# Patient Record
Sex: MALE | Race: WHITE | Employment: STUDENT | URBAN - NONMETROPOLITAN AREA
[De-identification: names, ages, dates, MRNs, and addresses within clinical notes are randomized per-mention and may not be internally consistent; named-entity substitution may affect disease eponyms.]

---
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
## 2022-03-07 ENCOUNTER — HOSPITAL ENCOUNTER (EMERGENCY)
Age: 22
Discharge: HOME | End: 2022-03-07
Attending: FAMILY MEDICINE
Payer: COMMERCIAL

## 2022-03-07 VITALS
DIASTOLIC BLOOD PRESSURE: 82 MMHG | OXYGEN SATURATION: 99 % | WEIGHT: 170.4 LBS | SYSTOLIC BLOOD PRESSURE: 137 MMHG | HEIGHT: 74 IN | TEMPERATURE: 98.6 F | HEART RATE: 83 BPM | RESPIRATION RATE: 18 BRPM | BODY MASS INDEX: 21.87 KG/M2

## 2022-03-07 DIAGNOSIS — J10.1 INFLUENZA A: Primary | ICD-10-CM

## 2022-03-07 LAB
DIRECT EXAM: ABNORMAL
DIRECT EXAM: ABNORMAL
DIRECT EXAM: NORMAL
SARS-COV-2, RAPID: NOT DETECTED
SPECIMEN DESCRIPTION: ABNORMAL
SPECIMEN DESCRIPTION: NORMAL
SPECIMEN DESCRIPTION: NORMAL

## 2022-03-07 PROCEDURE — 87880 STREP A ASSAY W/OPTIC: CPT

## 2022-03-07 PROCEDURE — 87635 SARS-COV-2 COVID-19 AMP PRB: CPT

## 2022-03-07 PROCEDURE — 87804 INFLUENZA ASSAY W/OPTIC: CPT

## 2022-03-07 PROCEDURE — 36415 COLL VENOUS BLD VENIPUNCTURE: CPT

## 2022-03-07 PROCEDURE — 99283 EMERGENCY DEPT VISIT LOW MDM: CPT

## 2022-03-07 ASSESSMENT — PAIN - FUNCTIONAL ASSESSMENT: PAIN_FUNCTIONAL_ASSESSMENT: 0-10

## 2022-03-07 ASSESSMENT — PAIN SCALES - GENERAL: PAINLEVEL_OUTOF10: 6

## 2022-03-07 ASSESSMENT — PAIN DESCRIPTION - LOCATION: LOCATION: HEAD

## 2022-03-07 ASSESSMENT — PAIN DESCRIPTION - DESCRIPTORS: DESCRIPTORS: SQUEEZING

## 2022-03-07 ASSESSMENT — PAIN DESCRIPTION - PAIN TYPE: TYPE: ACUTE PAIN

## 2022-03-07 NOTE — ED PROVIDER NOTES
[address] [facility]  eMERGENCY dEPARTMENT eNCOUnter          [address] [facility]       Chief Complaint   Patient presents with    Fever     Pt states has had fever on and off for that past 3 days. Also feels congested, body aches, and vomiting    Headache     States has had a headache \"feels like a band all the way around, maybe a migraine\"       Nurses Notes reviewed and I agree except as noted in the HPI. HISTORY OF PRESENT ILLNESS    Mitch Cooper is a 25 y.o. male who presents the emergency room via private vehicle, patient complaining of remittent fevers for the past 3 days, it has been had some congestion and general aches, has had some vomiting estimates only 3 events last night, as of having some headaches, describing a bandlike feeling around his head, states earlier he did have some photophobia though none current. .  Patient denies any diarrhea denies any rash, denies any falls or trauma denies any striking in the head. Patient denies any history of headaches or migraines prior. Patient states developed a sore throat this morning. No known sick contacts. Patient has been using ibuprofen at home to control his fevers. Patient rates his overall discomfort 6 out of 10 in his head. PCP: Romi Ball    REVIEW OF SYSTEMS     Review of Systems   All other systems reviewed and are negative. PAST MEDICAL HISTORY    has no past medical history on file. SURGICAL HISTORY      has no past surgical history on file. CURRENT MEDICATIONS       Discharge Medication List as of 3/7/2022 11:35 AM      CONTINUE these medications which have NOT CHANGED    Details   albuterol sulfate HFA (PROVENTIL HFA) 108 (90 Base) MCG/ACT inhaler Inhale 2 puffs into the lungs every 4 hours as needed for Wheezing or Shortness of Breath, Disp-1 Inhaler, R-1Print             ALLERGIES     has No Known Allergies. FAMILY HISTORY     He indicated that his father is alive.    family history includes High Blood Pressure in his father. SOCIAL HISTORY      reports that he quit smoking about 11 months ago. He started smoking about 5 years ago. He has a 1.00 pack-year smoking history. He has never used smokeless tobacco. He reports current drug use. Drug: Marijuana Lanis Lay). He reports that he does not drink alcohol. PHYSICAL EXAM     INITIAL VITALS:  height is 6' 2\" (1.88 m) and weight is 170 lb 6.4 oz (77.3 kg). His oral temperature is 98.6 °F (37 °C). His blood pressure is 137/82 and his pulse is 83. His respiration is 18 and oxygen saturation is 99%. Physical Exam   Constitutional: Patient is oriented to person, place, and time. Patient appears well-developed and well-nourished. Patient is active and cooperative. HENT:   Head: Normocephalic and atraumatic. Head is without contusion. Right Ear: Hearing and external ear normal. No drainage. Left Ear: Hearing and external ear normal. No drainage. Nose: Nose normal. No nasal deformity. No epistaxis. Mouth/Throat: Mucous membranes are not dry. Negative oral lesions or exudate, noted some posterior pharyngeal erythema  Eyes: EOMI. Conjunctivae, sclera, and lids are normal. Right eye exhibits no discharge. Left eye exhibits no discharge. Neck: Full passive range of motion without pain and phonation normal.   Cardiovascular:  Normal rate, regular rhythm and intact distal pulses. Pulses: Right radial pulse  2+   Pulmonary/Chest: Effort normal. No tachypnea and no bradypnea. No wheezes, rhonchi, or rales. Abdominal: Soft. Patient without distension or tenderness  Musculoskeletal:   Negative acute trauma or deformity,  apparent full range of motion and normal strength all extremities appropriate to age. Neurological: Patient is alert and oriented to person, place, and time. patient displays no tremor. Patient displays no seizure activity. .  Lymphatic: No gross cervical lymphadenopathy  Skin: Skin is warm and dry. Patient is not diaphoretic.   Psychiatric: Patient has a normal mood and affect. Patient speech is normal and behavior is normal. Cognition and memory are normal.    DIFFERENTIAL DIAGNOSIS:   Viral illness NOS    DIAGNOSTIC RESULTS           RADIOLOGY: non-plain film images(s) such as CT, Ultrasound and MRI are read by the radiologist.  No orders to display       LABS:   Labs Reviewed   RAPID INFLUENZA A/B ANTIGENS   COVID-19, RAPID   STREP SCREEN GROUP A THROAT       EMERGENCY DEPARTMENT COURSE:   Vitals:    Vitals:    03/07/22 1005   BP: 137/82   Pulse: 83   Resp: 18   Temp: 98.6 °F (37 °C)   TempSrc: Oral   SpO2: 99%   Weight: 170 lb 6.4 oz (77.3 kg)   Height: 6' 2\" (1.88 m)     Patient history and physical exam taken at bedside, discussed patient symptoms and exam findings, discussed like to get oral nasal swabs for patient, some basic blood work, patient vies that he has a fear of needles and is declining any blood work at this time, advised this is difficult as is limits the ability to work-up and diagnosis, patient is insistent that he [place] cannot have blood work due to his fear. We will continue with oral nasal swabs at this time. Explained that with clear lungs auscultation and good pulse oximetry and I feel a chest x-ray would be of additional benefit, patient acknowledges. Rapid Covid negative, rapid strep negative, rapid influenza  Positive    Discussed with patient diagnosis influenza A, discussed this is a viral illness, discussed expectation and timeline, discussed OTC cough cold flu medications, importance of hydration, prevention of spread to others, follow-up with primary care, return to ER if any symptoms change worse other concerns, acknowledged    FINAL IMPRESSION      1.  Influenza A          DISPOSITION/PLAN   D/c    PATIENT REFERRED TO:  Kathryn Dixon DO  [address] [facility] 21     Call       HOSP [facility] ED  [address] [facility] [address] [phone]    As needed, If symptoms worsen      DISCHARGE MEDICATIONS:  Discharge Medication List as of 3/7/2022 11:35 AM              Summation      Patient Course:  D/c    ED Medications administered this visit:  Medications - No data to display    New Prescriptions from this visit:    Discharge Medication List as of 3/7/2022 11:35 AM          Follow-up:  Roland Lozoya DO  [address] [facility] [address]  [phone]    Call       HOSP [facility] ED  [address] [place] [address]  [phone]    As needed, If symptoms worsen        Final Impression:   1.  Influenza A               (Please note that portions of this note were completed with a voice recognition program.  Efforts were made to edit the dictations but occasionally words are mis-transcribed.)    MD Shaniqua Cardoso MD  03/07/22 [id number]

## 2022-04-26 ENCOUNTER — OFFICE VISIT (OUTPATIENT)
Dept: PRIMARY CARE CLINIC | Age: 22
End: 2022-04-26
Payer: COMMERCIAL

## 2022-04-26 VITALS
DIASTOLIC BLOOD PRESSURE: 81 MMHG | OXYGEN SATURATION: 100 % | RESPIRATION RATE: 18 BRPM | WEIGHT: 170 LBS | HEART RATE: 52 BPM | HEIGHT: 74 IN | BODY MASS INDEX: 21.82 KG/M2 | TEMPERATURE: 98 F | SYSTOLIC BLOOD PRESSURE: 137 MMHG

## 2022-04-26 DIAGNOSIS — K04.7 TOOTH INFECTION: Primary | ICD-10-CM

## 2022-04-26 DIAGNOSIS — J02.9 SORE THROAT: ICD-10-CM

## 2022-04-26 LAB — S PYO AG THROAT QL: NORMAL

## 2022-04-26 PROCEDURE — 1036F TOBACCO NON-USER: CPT | Performed by: NURSE PRACTITIONER

## 2022-04-26 PROCEDURE — G8420 CALC BMI NORM PARAMETERS: HCPCS | Performed by: NURSE PRACTITIONER

## 2022-04-26 PROCEDURE — 99213 OFFICE O/P EST LOW 20 MIN: CPT | Performed by: NURSE PRACTITIONER

## 2022-04-26 PROCEDURE — 87880 STREP A ASSAY W/OPTIC: CPT | Performed by: NURSE PRACTITIONER

## 2022-04-26 PROCEDURE — G8427 DOCREV CUR MEDS BY ELIG CLIN: HCPCS | Performed by: NURSE PRACTITIONER

## 2022-04-26 RX ORDER — AMOXICILLIN AND CLAVULANATE POTASSIUM 875; 125 MG/1; MG/1
1 TABLET, FILM COATED ORAL 2 TIMES DAILY
Qty: 20 TABLET | Refills: 0 | Status: SHIPPED | OUTPATIENT
Start: 2022-04-26 | End: 2022-05-06

## 2022-04-26 RX ORDER — OMEPRAZOLE 20 MG/1
CAPSULE, DELAYED RELEASE ORAL
COMMUNITY
Start: 2022-03-22

## 2022-04-26 RX ORDER — DEXAMETHASONE 4 MG/1
TABLET ORAL
COMMUNITY
Start: 2022-03-22

## 2022-04-26 NOTE — LETTER
[address]    [place] [address]  Phone: [phone]  Fax: [address] [facility], [address], APRN - CNP        April 26, 2022     Patient: Rajeev Wong   YOB: 2000   Date of Visit: 4/26/2022       To Whom It May Concern: It is my medical opinion that Lashae Estrella may return to work on 04/27/2022. If you have any questions or concerns, please don't hesitate to call.     Sincerely,        Juan King, PERLA - CNP

## 2022-04-26 NOTE — PATIENT INSTRUCTIONS
Patient Education      Patient Education   [facility]  [address], [address]  [address] [facility]  [address]  [address]  [phone]                 Abscessed Tooth: Care Instructions  Overview     An abscessed tooth is a tooth that has a pocket of pus in the tissues around it. Pus forms when the body tries to fight an infection caused by bacteria. If the pus cannot drain, it forms an abscess. An abscessed tooth can cause red, swollen gums and throbbing pain, especially when you chew. You may have a badtaste in your mouth and a fever, and your jaw may swell. Damage to the tooth, untreated tooth decay, or gum disease can cause anabscessed tooth. An abscessed tooth needs to be treated by a dental professional right away. If it is not treated, the infection could spread to other parts of your body. Your dentist will give you antibiotics to stop the infection. If antibiotics don'tstop the infection, you may need other treatments. Follow-up care is a key part of your treatment and safety. Be sure to make and go to all appointments, and call your doctor if you are having problems. It's also a good idea to know your test results and keep alist of the medicines you take. How can you care for yourself at home?  Brush and floss gently.  Reduce pain and swelling in your face and jaw by putting ice or a cold pack on the outside of your cheek. Do this for 10 to 20 minutes at a time. Put a thin cloth between the ice and your skin.  Avoid using tobacco products. Tobacco and nicotine slow your ability to heal.   Take pain medicines exactly as directed. ? If the doctor gave you a prescription medicine for pain, take it as prescribed.   ? If you are not taking a prescription pain medicine, ask your doctor if you can take an over-the-counter medicine such as acetaminophen (Tylenol) or ibuprofen (Advil or Motrin). Be safe with medicines. Read and follow all instructions on the label.  Take antibiotics as directed. Do not stop taking them just because you feel better. You need to take the full course of antibiotics. When should you call for help? Call 911 anytime you think you may need emergency care. For example, call if:     You have trouble breathing. Call your doctor now or seek immediate medical care if:     You have new or worse symptoms of infection, such as:  ? Increased pain, swelling, warmth, or redness. ? Red streaks leading from the area. ? Pus draining from the area. ? A fever. Watch closely for changes in your health, and be sure to contact your doctor if:     You do not get better as expected. Where can you learn more? Go to [email or web address].[email or web address]. org and sign in to your [employer] account. Enter L309 in the Elliptic box to learn more about \"Abscessed Tooth: Care Instructions. \"     If you do not have an account, please click on the \"Sign Up Now\" link. Current as of: June 30, 2021               Content Version: 13.2  © 2290-1098 [employer]. Care instructions adapted under license by [facility] ([facility]). If you have questions about a medical condition or this instruction, always ask your healthcare professional. [place] [address] any warranty or liability for your use of this information. amoxicillin and clavulanate potassium  Pronunciation: am OK i JOVAN in [address] TAS ee um  Brand: Augmentin  What is the most important information I should know about amoxicillin and clavulanate potassium?   You should not use this medicine if you have severe kidney disease, if you have had liver problems or jaundice while taking amoxicillin and clavulanate potassium, or if you are allergic to any penicillin or cephalosporinantibiotic, such as Amoxil, Ceftin, Cefzil, Moxatag, Omnicef, and others. What is amoxicillin and clavulanate potassium? Amoxicillin is a penicillin antibiotic. Clavulanate potassium helps preventcertain bacteria from becoming resistant to amoxicillin. Amoxicillin and clavulanate potassium is a combination medicine used to treat many different infections caused by bacteria, such as sinusitis, pneumonia, earinfections, bronchitis, urinary tract infections, and infections of the skin. Amoxicillin and clavulanate potassium may also be used for purposes not listedin this medication guide. What should I discuss with my healthcare provider before taking amoxicillin and clavulanate potassium? You should not use this medicine if you are allergic to it, or if:   you have severe kidney disease (or if you are on dialysis);   you have had liver problems or jaundice while taking amoxicillin and clavulanate potassium; or   you are allergic to any penicillin or cephalosporin antibiotic, such as Amoxil, Ceftin, Cefzil, Moxatag, Omnicef, and others. Tell your doctor if you have ever had:   liver disease (hepatitis or jaundice);   kidney disease; or   mononucleosis. The liquid or chewable tablet may contain phenylalanine. Tell your doctor if you have phenylketonuria (PKU). Tell your doctor if you are pregnant or breastfeeding. Amoxicillin and clavulanate potassium can make birth control pills less effective. Ask your doctor about using a non-hormonal birth control (condom,diaphragm, cervical cap, or contraceptive sponge) to prevent pregnancy. Do not give this medicine to a child without medical advice. How should I take amoxicillin and clavulanate potassium? Follow all directions on your prescription label and read all medication guidesor instruction sheets. Use the medicine exactly as directed. Amoxicillin and clavulanate potassium may work best if you take it at the [facility] a meal.  Take the medicine every 12 hours.   Do not crush or chew the extended-release tablet. Swallow the pill whole, or break the pill in half and take both halves one elsi time. Tell your doctor if you have trouble swallowing a whole or half pill. You must chew the chewable tablet before you swallow it. Shake the oral suspension (liquid) before you measure a dose. Use the dosing syringe provided, or use amedicine dose-measuring device (not a kitchen spoon). This medicine can affect the results of certain medical tests. Tell any doctorwho treats you that you are using amoxicillin and clavulanate potassium. Use this medicine for the full prescribed length of time, even if your symptoms quickly improve. Skipping doses can increase your risk of infection that is resistant to medication. Amoxicillin and clavulanate potassium will not treat aviral infection such as the flu or a common cold. Store the tablets at room temperature away from moisture and heat. Store the liquid in the refrigerator. Throw away any unused liquid after 10 days. What happens if I miss a dose? Take the medicine as soon as you can, but skip the missed dose if it is almost time for your next dose. Do not take two doses at one time. What happens if I overdose? Seek emergency medical attention or call the Poison Help line at 1-733.418.2382. Overdose can cause nausea, vomiting, stomach pain, diarrhea, skin rash,drowsiness, hyperactivity, and decreased urination. What should I avoid while taking amoxicillin and clavulanate potassium? Avoid taking this medicine together with or just after eating a high-fat meal.This will make it harder for your body to absorb the medication. Antibiotic medicines can cause diarrhea, which may be a sign of a new infection. If you have diarrhea that is watery or bloody, call your doctor before using anti-diarrhea medicine. What are the possible side effects of amoxicillin and clavulanate potassium?   Get emergency medical help if you have signs of an allergic reaction (hives, difficult breathing, swelling in your face or throat) or a severe skin reaction (fever, sore throat, burning eyes, skin pain, red or purple skin rash withblistering and peeling). Call your doctor at once if you have:   severe stomach pain, diarrhea that is watery or bloody (even if it occurs months after your last dose);   pale or yellowed skin, dark colored urine, fever, confusion or weakness;   loss of appetite, upper stomach pain;   little or no urination; or   easy bruising or bleeding. Common side effects may include:   nausea, vomiting; diarrhea;   rash, itching;   vaginal itching or discharge; or   diaper rash. This is not a complete list of side effects and others may occur. Call your doctor for medical advice about side effects. You may report side effects toFDA at 2-582-OHL-4476. What other drugs will affect amoxicillin and clavulanate potassium? Tell your doctor about all your other medicines, especially:   allopurinol;   probenecid; or   a blood thinner --warfarin, Coumadin, Jantoven. This list is not complete. Other drugs may affect amoxicillin and clavulanate potassium, including prescription and over-the-counter medicines, vitamins, andherbal products. Not all possible drug interactions are listed here. Where can I get more information? Your pharmacist can provide more information about amoxicillin and clavulanatepotassium. Remember, keep this and all other medicines out of the reach of children, never share your medicines with others, and use this medication only for the indication prescribed. Every effort has been made to ensure that the information provided by [address] is accurate, up-to-date, and complete, but no guarantee is made to that effect. Drug information contained herein may be time sensitive.  [facility] information has been compiled for use by healthcare practitioners and consumers in the [place] and therefore [facility] does not warrant that uses outside of the [place] are appropriate, unless specifically indicated otherwise. [facility] [employer] drug information does not endorse drugs, diagnose patients or recommend therapy. [facility] drug information is an informational resource designed to assist licensed healthcare practitioners in caring for their patients and/or to serve consumers viewing this service as a supplement to, and not a substitute for, the expertise, skill, knowledge and judgment of healthcare practitioners. The absence of a warning for a given drug or drug combination in no way should be construed to indicate that the drug or drug combination is safe, effective or appropriate for any given patient. [facility] does not assume any responsibility for any aspect of healthcare administered with the aid of information [facility] provides. The information contained herein is not intended to cover all possible uses, directions, precautions, warnings, drug interactions, allergic reactions, or adverse effects. If you have questions about the drugs you are taking, check with yourdoctor, nurse or pharmacist.  Copyright 6867-2576 [address]: 12.01. Revision date:2/24/2020. Care instructions adapted under license by [facility] [place] ([facility]). If you have questions about a medical condition or this instruction, always ask your healthcare professional. [place] [address] any warranty or liability for your use of this information.

## 2022-04-26 NOTE — PROGRESS NOTES
Chief Complaint:   Oral Swelling (Sore gums, hurts to open mouth, teeth hurt, around [place] tooth area on both sides.) and Nasal Congestion (Started last thursday-Congestion, headache-has improved.)      History of Present Illness   Source of history provided by:  patient. Alberta Canchola is a 25 y.o. old male who No past medical history on file. presents to the walk in clinic for evaluation of dental pain. According Lisa Foster he had what he felt was a cold that started approximately 6 days ago. He reports that symptoms are still lingering but he feels they are improving. Today he presents with concerns for dental pain of the bilateral lower jaw around his wisdom teeth with gum swelling and redness. He is also concerned for a tender nodule on the left lateral neck area just before his left earlobe. He denies any trauma. He denies any sinus pain/pressure. There is no pain to the roof of his mouth. He denies fever or oral swelling. He denies any ear pain. In the ear drainage. Pt is able to handle their own secretions and drink fluids without difficulty. ROS   Unless otherwise stated in this report or unable to obtain because of the patient's clinical or mental status as evidenced by the medical record, this patients's positive and negative responses for Review of Systems, constitutional, psych, eyes, ENT, cardiovascular, respiratory, gastrointestinal, neurological, genitourinary, musculoskeletal, integument systems and systems related to the presenting problem are either stated in the preceding or were not pertinent or were negative for the symptoms and/or complaints related to the medical problem. Past Surgical History:  has no past surgical history on file. Social History:  reports that he quit smoking about 12 months ago. He started smoking about 5 years ago. He has a 1.00 pack-year smoking history. He has never used smokeless tobacco. He reports current drug use. Drug: Marijuana Carmen Dasen).  He reports that he does not drink alcohol. Family History: family history includes High Blood Pressure in his father. Allergies: Patient has no known allergies. Physical Exam       VS:  /81 (Site: Right Upper Arm, Position: Sitting, Cuff Size: Medium Adult)   Pulse 52   Temp 98 °F (36.7 °C) (Oral)   Resp 18   Ht 6' 2\" (1.88 m)   Wt 170 lb (77.1 kg)   SpO2 100%   BMI 21.83 kg/m²     Constitutional:  Alert, development consistent with age. HEENT:  NC/NT. Airway patent. Eyes PERRL. Ears with normal bilateral TM's and normal bilateral canals. Neck:  Supple. Normal ROM. Left-sided superficial cervical lymph node that is tender to palpation, measuring approximately 2 to 3 cm with a smooth round borders and easily mobile. There is no surrounding erythema of the covering tissue. Lips:  No erythema or abrasions noted. Mouth:  Normal tongue and moist buccal mucosa. Floor of the mouth is soft. No tenderness in the submental or submandibular space. No tongue elevation. Dental: No obvious decay noted. No trismus present. No facial edema or redness noted. No drooling. Gingiva surrounding tooth #17 noted without any obvious drainage or abscess. Respiratory:  Clear to auscultation and breath sounds equal.    CV: Regular rate and rhythm, normal heart sounds, without  ectopy, gallops, or rubs. Skin:  No rashes, erythema or lesions present, unless noted elsewhere. Neurological:  Alert and oriented. Motor functions intact. Lab / Imaging Results   (All laboratory and radiology results have been personally reviewed by myself)  Labs:      Imaging: All Radiology results interpreted by Radiologist unless otherwise noted. Medical Decision Making   Pt non-toxic, in no apparent distress and stable at time of discharge. Assessment / Plan     Impression(s):  Ajit Mcgee was seen today for oral swelling and nasal congestion.     Diagnoses and all orders for this visit:    Tooth infection  - amoxicillin-clavulanate (AUGMENTIN) 875-125 MG per tablet; Take 1 tablet by mouth 2 times daily for 10 days    Sore throat  -     POCT rapid strep ALEXIS Wells appears well, well-hydrated with clear lung sounds, vital signs stable. - POC strep is negative today. -Treating today with Augmentin due to concern for a dental infection, administration/ADRs/probiotics discussed. Encouraged follow-up with a dentist and he was provided with a list of dental clinics with contact information.  -Discussed concern for reactive lymph node; encourage he follow-up either at his PCP or this office if lymph node does not improve after Augmentin. Will consider further evaluation with sonogram of the area. -Strict ED follow up for any worsening, concerns or if facial swelling, difficulty swallowing after antibiotic started. Nancy Ocampo, APRN - CNP    *This report was transcribed using voice recognition software. Every effort was made to ensure accuracy; however, inadvertent computerized transcription errors may be present.

## 2022-10-25 NOTE — ED NOTES
Called pt quincy Aguilera at phone number [phone]. Nissa Aguilera did not answer I left a message asking if  Drug test or BAT test were required for workers comp.  Gave ER phone number for call back      Daniel Leonardo RN  06/08/20 5729 Pt confirm appointment with Dr Stearns on 10/26/2022 at the [facility]..

## 2022-11-08 ENCOUNTER — OFFICE VISIT (OUTPATIENT)
Dept: PRIMARY CARE CLINIC | Age: 22
End: 2022-11-08
Payer: COMMERCIAL

## 2022-11-08 VITALS
BODY MASS INDEX: 22.45 KG/M2 | HEIGHT: 74 IN | OXYGEN SATURATION: 100 % | TEMPERATURE: 98.8 F | WEIGHT: 174.9 LBS | DIASTOLIC BLOOD PRESSURE: 95 MMHG | RESPIRATION RATE: 18 BRPM | SYSTOLIC BLOOD PRESSURE: 158 MMHG | HEART RATE: 75 BPM

## 2022-11-08 DIAGNOSIS — L02.31 ABSCESS OF LEFT BUTTOCK: Primary | ICD-10-CM

## 2022-11-08 PROCEDURE — 1036F TOBACCO NON-USER: CPT | Performed by: NURSE PRACTITIONER

## 2022-11-08 PROCEDURE — G8427 DOCREV CUR MEDS BY ELIG CLIN: HCPCS | Performed by: NURSE PRACTITIONER

## 2022-11-08 PROCEDURE — G8420 CALC BMI NORM PARAMETERS: HCPCS | Performed by: NURSE PRACTITIONER

## 2022-11-08 PROCEDURE — 99213 OFFICE O/P EST LOW 20 MIN: CPT | Performed by: NURSE PRACTITIONER

## 2022-11-08 PROCEDURE — G8484 FLU IMMUNIZE NO ADMIN: HCPCS | Performed by: NURSE PRACTITIONER

## 2022-11-08 RX ORDER — DOXYCYCLINE 100 MG/1
100 CAPSULE ORAL 2 TIMES DAILY
Qty: 14 CAPSULE | Refills: 0 | Status: SHIPPED | OUTPATIENT
Start: 2022-11-08 | End: 2022-11-15

## 2022-11-08 NOTE — PROGRESS NOTES
Chief Complaint:   Rash (Started 3-4 days ago-Has a rash on left buttocks, was itching but doesn't now. Now has a bubble there and wants to make sure it's not infected.)      History of Present Illness   Source of history provided by:  {Information source:06012}. Jasen Vasquez is a 23 y.o. old male who has a past medical history of: No past medical history on file. Presents to the walk in clinic for evaluation of redness to the ***, which began *** days ago. Reports the area is warm to touch, moderately painful, and swollen. *** lymphangitic streaking. *** any bleeding or active drainage. Since onset the symptoms have progressed. Denies any fever, chills, HA, recent illness, myalgias, nausea, vomiting, or lethargy. ROS   Unless otherwise stated in this report or unable to obtain because of the patient's clinical or mental status as evidenced by the medical record, this patients's positive and negative responses for Review of Systems, constitutional, psych, eyes, ENT, cardiovascular, respiratory, gastrointestinal, neurological, genitourinary, musculoskeletal, integument systems and systems related to the presenting problem are either stated in the preceding or were not pertinent or were negative for the symptoms and/or complaints related to the medical problem. Past Surgical History:  has no past surgical history on file. Social History:  reports that he quit smoking about 19 months ago. His smoking use included cigarettes. He started smoking about 5 years ago. He has a 1.00 pack-year smoking history. He has never used smokeless tobacco. He reports current drug use. Drug: Marijuana Kim Hikes). He reports that he does not drink alcohol. Family History: family history includes High Blood Pressure in his father. Allergies: Patient has no known allergies.     Physical Exam    VS:  BP (!) 158/95 (Site: Left Upper Arm, Position: Sitting, Cuff Size: Medium Adult)   Pulse 75   Temp 98.8 °F (37.1 °C) (Oral) Resp 18   Ht 6' 2\" (1.88 m)   Wt 174 lb 14.4 oz (79.3 kg)   SpO2 100%   BMI 22.46 kg/m²      Constitutional:  Alert, development consistent with age. NAD. Lungs:  CTAB without wheezing, rales, or rhonchi. Heart:  Regular rate and rhythm, no pathologic murmurs, rubs, or gallops. Skin:  Normal turgor and appropriately dry to touch. Raised, erythematous region over the left upper buttock measuring approximately *** in size, consistent with an abscess. Moderate TTP and warmth over the same area. *** bleeding or drainage noted. No lymphangitic streaking. Neurological:  Orientation age-appropriate unless noted elseware. Motor functions intact. Lab / Imaging Results   (All laboratory and radiology results have been personally reviewed by myself)  Labs:      Imaging: All Radiology results interpreted by Radiologist unless otherwise noted. Medical Decision Making   Pt non-toxic, in no apparent distress and stable at time of discharge. Assessment / Plan   Impression(s):  Aleydatien Lexi was seen today for rash. Diagnoses and all orders for this visit:    Abscess of left buttock  -     doxycycline monohydrate (MONODOX) 100 MG capsule; Take 1 capsule by mouth 2 times daily for 7 days  -     [place], [place], General Surgery, [place]      23y.o. year old male presenting with ****. Armani Cruz is well-appearing, well-hydrated and afebrile.  -Treating empirically today with *****; administration/ADRs/probiotics discussed.  -Wound care measures discussed at length, including use of warm compresses, keeping the area clean and dry and covered with a bandage if draining.  -Encouraged that he follow-up with her PCP next week for recheck. Encouraged that they are unable to schedule an appointment with her PCP next week to follow-up at this office for reevaluation. -ED sooner if symptoms worsen or change. - Pt is in agreement with this care plan. All questions answered.     PERLA Swenson -

## 2022-11-08 NOTE — LETTER
[address]    [facility] [address]  Phone: [phone]  Fax: [address] [facility], [address], APRN - CNP        November 8, 2022     Patient: Armani Cruz   YOB: 2000   Date of Visit: 11/8/2022       To Whom It May Concern: It is my medical opinion that Crissie Lexi Back may return to work on 11/09/2022. If you have any questions or concerns, please don't hesitate to call.     Sincerely,        Steve Betancourt, PERLA - CNP

## 2022-11-08 NOTE — PATIENT INSTRUCTIONS
SURVEY:    You may be receiving a survey from [employer] regarding your visit today. Please complete the survey to enable us to provide the highest quality of care to you and your family. If you cannot score us a very good on any question, please call the office to discuss how we could of made your experience a very good one. Thank you for letting us take care of you today. We hope all your questions were addressed. If a question was overlooked or something else comes to mind after you return home, please contact a member of your Care Team listed below.     Thank you,  Homero Bailey MA      Your Care Team at [address] [facility]  Provider- DELIA Pinedo  Provider- DELIA Urbina  [address] [address]  Reception- [place], [place]      Walk-in contact numbers:       Phone: [phone]                 Fax: [phone]    Aditi Steele Hours:  Mon-Thurs: 9:00 am - 5:30 pm     Friday: 8:00 am - 12:00 pm           Sat-Sun: CLOSED

## 2022-11-08 NOTE — PROGRESS NOTES
Chief Complaint:   Rash (Started 3-4 days ago-Has a rash on left buttocks, was itching but doesn't now. Now has a bubble there and wants to make sure it's not infected.)      History of Present Illness   Source of history provided by:  patient. Josselin Oseguera is a 23 y.o. old male who has a past medical history of: No past medical history on file. Presents to the walk in clinic for evaluation of a painful rash/sore to the left buttock that he noticed 2 to 3 days ago. According to Butch Gill, he has always suffered from what he describes as \"eczema\". He recently noticed that his left upper buttock began to itch causing him to scratch at the area. He had tried using leftover triamcinolone cream without improvements to the area. Today, he presents as the area is painful and he has noticed some drainage. Denies any known cause for the rash including any new soaps, detergents, lotions, foods, or medications. Since onset the symptoms have progressed. Denies any lymphangitic streaking, fever, chills, HA , dyspnea, dysphagia, recent illness, myalgias, vomiting, or lethargy. ROS   Unless otherwise stated in this report or unable to obtain because of the patient's clinical or mental status as evidenced by the medical record, this patients's positive and negative responses for Review of Systems, constitutional, psych, eyes, ENT, cardiovascular, respiratory, gastrointestinal, neurological, genitourinary, musculoskeletal, integument systems and systems related to the presenting problem are either stated in the preceding or were not pertinent or were negative for the symptoms and/or complaints related to the medical problem. Past Surgical History:  has no past surgical history on file. Social History:  reports that he quit smoking about 19 months ago. His smoking use included cigarettes. He started smoking about 5 years ago. He has a 1.00 pack-year smoking history.  He has never used smokeless tobacco. He reports current drug use. Drug: Marijuana Alfornia Rajesh). He reports that he does not drink alcohol. Family History: family history includes High Blood Pressure in his father. Allergies: Patient has no known allergies. Physical Exam     VS:  BP (!) 158/95 (Site: Left Upper Arm, Position: Sitting, Cuff Size: Medium Adult)   Pulse 75   Temp 98.8 °F (37.1 °C) (Oral)   Resp 18   Ht 6' 2\" (1.88 m)   Wt 174 lb 14.4 oz (79.3 kg)   SpO2 100%   BMI 22.46 kg/m²        Constitutional:  Alert, development consistent with age. Afebrile. Lungs:  CTAB, no wheezing, rales, or rhonchi  Heart:  RRR without pathologic murmurs  Skin:  Normal turgor and appropriately dry to touch. Erythematous, raised, painful, nonfluctuant/firm nodule measuring approximately 2 cm noted to the left upper buttock near gluteal cleft with an intact scab. There is no active drainage. No bleeding. No lymphangitic streaking. Neurological:  Orientation age-appropriate unless noted elsewhere. Motor functions intact. Lab / Imaging Results   (All laboratory and radiology results have been personally reviewed by myself)  Labs:      Imaging: All Radiology results interpreted by Radiologist unless otherwise noted. Medical Decision Making   Pt non-toxic, in no apparent distress and stable at time of discharge. Assessment / Plan   Impression(s):  Lydia Stephens was seen today for rash. Diagnoses and all orders for this visit:    Abscess of left buttock  -     doxycycline monohydrate (MONODOX) 100 MG capsule; Take 1 capsule by mouth 2 times daily for 7 days  -     [facility]  [place], [place], DO, General Surgery, [place]    - Treating today with Doxycycline; administration/ADRs discussed. - Wound care measures discussed at length, including use of warm compresses, keeping the area clean and dry and covered with a bandage if draining.  - Referral to general surgery for further evaluation/management.   Appointment arranged for tomorrow with Dr. Preston lovelace at [phone] in her [place] office.  - Strict follow-up to ED if any worsening or concerns.       Trini Luna, APRN - CNP

## 2023-03-27 SDOH — HEALTH STABILITY: PHYSICAL HEALTH: ON AVERAGE, HOW MANY MINUTES DO YOU ENGAGE IN EXERCISE AT THIS LEVEL?: 60 MIN

## 2023-03-27 SDOH — HEALTH STABILITY: PHYSICAL HEALTH: ON AVERAGE, HOW MANY DAYS PER WEEK DO YOU ENGAGE IN MODERATE TO STRENUOUS EXERCISE (LIKE A BRISK WALK)?: 3 DAYS

## 2023-03-28 ENCOUNTER — OFFICE VISIT (OUTPATIENT)
Dept: FAMILY MEDICINE CLINIC | Age: 23
End: 2023-03-28
Payer: COMMERCIAL

## 2023-03-28 VITALS
SYSTOLIC BLOOD PRESSURE: 128 MMHG | HEIGHT: 76 IN | DIASTOLIC BLOOD PRESSURE: 82 MMHG | WEIGHT: 174 LBS | TEMPERATURE: 98.3 F | BODY MASS INDEX: 21.19 KG/M2 | OXYGEN SATURATION: 99 % | HEART RATE: 60 BPM

## 2023-03-28 DIAGNOSIS — Z83.49 FAMILY HISTORY OF THYROID DISEASE: ICD-10-CM

## 2023-03-28 DIAGNOSIS — J45.20 MILD INTERMITTENT ASTHMA WITHOUT COMPLICATION: ICD-10-CM

## 2023-03-28 DIAGNOSIS — K21.9 GASTROESOPHAGEAL REFLUX DISEASE WITHOUT ESOPHAGITIS: ICD-10-CM

## 2023-03-28 DIAGNOSIS — R29.898 TALL STATURE: ICD-10-CM

## 2023-03-28 DIAGNOSIS — F41.8 ANXIETY ABOUT HEALTH: ICD-10-CM

## 2023-03-28 DIAGNOSIS — L65.9 ALOPECIA: Primary | ICD-10-CM

## 2023-03-28 PROBLEM — J45.909 ASTHMA: Status: ACTIVE | Noted: 2019-08-27

## 2023-03-28 PROCEDURE — 99203 OFFICE O/P NEW LOW 30 MIN: CPT | Performed by: NURSE PRACTITIONER

## 2023-03-28 PROCEDURE — G8427 DOCREV CUR MEDS BY ELIG CLIN: HCPCS | Performed by: NURSE PRACTITIONER

## 2023-03-28 PROCEDURE — 1036F TOBACCO NON-USER: CPT | Performed by: NURSE PRACTITIONER

## 2023-03-28 PROCEDURE — G8420 CALC BMI NORM PARAMETERS: HCPCS | Performed by: NURSE PRACTITIONER

## 2023-03-28 PROCEDURE — G8484 FLU IMMUNIZE NO ADMIN: HCPCS | Performed by: NURSE PRACTITIONER

## 2023-03-28 RX ORDER — OMEPRAZOLE 20 MG/1
CAPSULE, DELAYED RELEASE ORAL
Qty: 90 CAPSULE | Refills: 1 | Status: SHIPPED | OUTPATIENT
Start: 2023-03-28

## 2023-03-28 RX ORDER — LORAZEPAM 0.5 MG/1
0.5 TABLET ORAL ONCE
Qty: 1 TABLET | Refills: 0 | Status: SHIPPED | OUTPATIENT
Start: 2023-03-28 | End: 2023-03-28

## 2023-03-28 SDOH — ECONOMIC STABILITY: FOOD INSECURITY: WITHIN THE PAST 12 MONTHS, THE FOOD YOU BOUGHT JUST DIDN'T LAST AND YOU DIDN'T HAVE MONEY TO GET MORE.: NEVER TRUE

## 2023-03-28 SDOH — ECONOMIC STABILITY: HOUSING INSECURITY
IN THE LAST 12 MONTHS, WAS THERE A TIME WHEN YOU DID NOT HAVE A STEADY PLACE TO SLEEP OR SLEPT IN A SHELTER (INCLUDING NOW)?: NO

## 2023-03-28 SDOH — ECONOMIC STABILITY: FOOD INSECURITY: WITHIN THE PAST 12 MONTHS, YOU WORRIED THAT YOUR FOOD WOULD RUN OUT BEFORE YOU GOT MONEY TO BUY MORE.: NEVER TRUE

## 2023-03-28 SDOH — ECONOMIC STABILITY: INCOME INSECURITY: HOW HARD IS IT FOR YOU TO PAY FOR THE VERY BASICS LIKE FOOD, HOUSING, MEDICAL CARE, AND HEATING?: NOT HARD AT ALL

## 2023-03-28 ASSESSMENT — ENCOUNTER SYMPTOMS
ABDOMINAL PAIN: 0
VOMITING: 0
COUGH: 0
BLOOD IN STOOL: 0
SORE THROAT: 0
DIARRHEA: 0
CONSTIPATION: 0
BACK PAIN: 0
SHORTNESS OF BREATH: 0
NAUSEA: 0

## 2023-03-28 ASSESSMENT — PATIENT HEALTH QUESTIONNAIRE - PHQ9
2. FEELING DOWN, DEPRESSED OR HOPELESS: 0
SUM OF ALL RESPONSES TO PHQ QUESTIONS 1-9: 0
1. LITTLE INTEREST OR PLEASURE IN DOING THINGS: 0
SUM OF ALL RESPONSES TO PHQ QUESTIONS 1-9: 0
SUM OF ALL RESPONSES TO PHQ QUESTIONS 1-9: 0
SUM OF ALL RESPONSES TO PHQ9 QUESTIONS 1 & 2: 0
SUM OF ALL RESPONSES TO PHQ QUESTIONS 1-9: 0

## 2023-03-28 NOTE — PROGRESS NOTES
04:30 PM    RBC 5.08 06/29/2020 04:30 PM    HGB 15.6 06/29/2020 04:30 PM    HCT 45.1 06/29/2020 04:30 PM    MCV 88.7 06/29/2020 04:30 PM    MCH 30.8 06/29/2020 04:30 PM    MCHC 34.7 06/29/2020 04:30 PM    RDW 13.7 06/29/2020 04:30 PM     06/29/2020 04:30 PM    MPV NOT REPORTED 06/29/2020 04:30 PM     No results found for: TSH  No results found for: CHOL, LDL, HDL, PSA, LABA1C       Assessment & Plan        Diagnosis Orders   1. Alopecia   -- Potentially a sign of thyroid disorder, especially given his family history of thyroid disease and other symptoms. Will send for labs. CBC with Auto Differential    Comprehensive Metabolic Panel    TSH with Reflex    Sedimentation Rate      2. Gastroesophageal reflux disease without esophagitis   -- Patient educated that omeprazole is a maintenance medicine and should be taken every day. Patient agrees to take medication every day. Continue to modify diet. 3. Mild intermittent asthma without complication   -- Doing well with as needed albuterol. Continue to track how many days per week rescue inhaler is needed. 4. Family history of thyroid disease  CBC with Auto Differential    Comprehensive Metabolic Panel    TSH with Reflex    Sedimentation Rate      5. Tall stature   -- Height to arm span ratio 1.01. Will send for abdominal aortic aneurysm screen. 6. Anxiety about health   --We will give patient one-time dose of Ativan prior to blood draw LORazepam (ATIVAN) 0.5 MG tablet                      Completed Refills   Requested Prescriptions     Signed Prescriptions Disp Refills    omeprazole (PRILOSEC) 20 MG delayed release capsule 90 capsule 1     Sig: TAKE 1 CAPSULE BY MOUTH EVERY DAY    LORazepam (ATIVAN) 0.5 MG tablet 1 tablet 0     Sig: Take 1 tablet by mouth once for 1 dose. Take 45 minutes before blood draw Max Daily Amount: 0.5 mg     No follow-ups on file.   Orders Placed This Encounter   Medications    omeprazole (PRILOSEC) 20 MG delayed

## 2023-05-26 ENCOUNTER — HOSPITAL ENCOUNTER (EMERGENCY)
Age: 23
Discharge: HOME | End: 2023-05-26
Attending: EMERGENCY MEDICINE
Payer: COMMERCIAL

## 2023-05-26 VITALS
DIASTOLIC BLOOD PRESSURE: 88 MMHG | RESPIRATION RATE: 18 BRPM | TEMPERATURE: 97.5 F | SYSTOLIC BLOOD PRESSURE: 137 MMHG | OXYGEN SATURATION: 98 % | HEART RATE: 68 BPM

## 2023-05-26 DIAGNOSIS — H60.392 INFECTIVE OTITIS EXTERNA OF LEFT EAR: Primary | ICD-10-CM

## 2023-05-26 PROCEDURE — 99282 EMERGENCY DEPT VISIT SF MDM: CPT

## 2023-05-26 ASSESSMENT — PAIN DESCRIPTION - ORIENTATION: ORIENTATION: LEFT

## 2023-05-26 ASSESSMENT — PAIN DESCRIPTION - LOCATION: LOCATION: EAR

## 2023-05-26 ASSESSMENT — PAIN SCALES - GENERAL: PAINLEVEL_OUTOF10: 2

## 2023-05-26 ASSESSMENT — PAIN DESCRIPTION - DESCRIPTORS: DESCRIPTORS: ACHING;SORE

## 2023-05-26 ASSESSMENT — PAIN - FUNCTIONAL ASSESSMENT: PAIN_FUNCTIONAL_ASSESSMENT: 0-10

## 2023-05-26 NOTE — ED PROVIDER NOTES
eMERGENCY dEPARTMENT eNCOUnter        [address] [facility]    Chief Complaint   Patient presents with    Ear Drainage     Pt states he noticed dried blood on his pillow this morning, left ear with some dried blood inside, a little painful this morning       [facility]    Cortney Vázquez is a 23 y.o. male who presents to ED with blood coming from the left ear. Patient noted some dried blood on  the pillow this morning. Patient had some more mild pain in the left ear. At time of exam patient is pain-free. Denies sore throat denies nasal congestion. REVIEW OF SYSTEMS    All systems reviewed and positives are in the [facility]      PAST MEDICAL HISTORY    Past Medical History:   Diagnosis Date    Asthma     GERD (gastroesophageal reflux disease)        SURGICAL HISTORY    History reviewed. No pertinent surgical history. CURRENT MEDICATIONS    Current Outpatient Rx   Medication Sig Dispense Refill    omeprazole (PRILOSEC) 20 MG delayed release capsule TAKE 1 CAPSULE BY MOUTH EVERY DAY 90 capsule 1    FLOVENT  MCG/ACT inhaler INHALE 2 PUFFS TWICE DAILY.  (Patient not taking: Reported on 2023)      albuterol sulfate HFA (PROVENTIL HFA) 108 (90 Base) MCG/ACT inhaler Inhale 2 puffs into the lungs every 4 hours as needed for Wheezing or Shortness of Breath (Patient not taking: Reported on 3/28/2023) 1 Inhaler 1       ALLERGIES    Allergies   Allergen Reactions    Gramineae Pollens Other (See Comments) and Shortness Of Breath       FAMILY HISTORY    Family History   Problem Relation Age of Onset    High Blood Pressure Father        SOCIAL HISTORY    Social History     Socioeconomic History    Marital status: Single     Spouse name: None    Number of children: None    Years of education: None    Highest education level: None   Tobacco Use    Smoking status: Former     Packs/day: 0.25     Years: 4.00     Pack years: 1.00     Types: Cigarettes     Start date:      Quit date: 2021     Years since quittin.1    Smokeless

## 2023-06-21 ENCOUNTER — HOSPITAL ENCOUNTER (OUTPATIENT)
Age: 23
Discharge: HOME | End: 2023-06-21
Payer: COMMERCIAL

## 2023-06-21 DIAGNOSIS — Z83.49 FAMILY HISTORY OF THYROID DISEASE: ICD-10-CM

## 2023-06-21 DIAGNOSIS — L65.9 ALOPECIA: ICD-10-CM

## 2023-06-21 LAB
ALBUMIN SERPL-MCNC: 5.3 G/DL (ref 3.5–5.2)
ALP SERPL-CCNC: 58 U/L (ref 40–129)
ALT SERPL-CCNC: 13 U/L (ref 5–41)
ANION GAP SERPL CALCULATED.3IONS-SCNC: 11 MMOL/L (ref 9–17)
AST SERPL-CCNC: 19 U/L
BASOPHILS # BLD: 0 K/UL (ref 0–0.2)
BASOPHILS NFR BLD: 0 % (ref 0–2)
BILIRUB SERPL-MCNC: 1.2 MG/DL (ref 0.3–1.2)
BUN SERPL-MCNC: 10 MG/DL (ref 6–20)
BUN/CREAT SERPL: 14 (ref 9–20)
CALCIUM SERPL-MCNC: 9.6 MG/DL (ref 8.6–10.4)
CHLORIDE SERPL-SCNC: 101 MMOL/L (ref 98–107)
CO2 SERPL-SCNC: 27 MMOL/L (ref 20–31)
CREAT SERPL-MCNC: 0.71 MG/DL (ref 0.7–1.2)
DIFFERENTIAL TYPE: YES
EOSINOPHIL # BLD: 0 K/UL (ref 0–0.4)
EOSINOPHILS RELATIVE PERCENT: 0 % (ref 0–5)
ERYTHROCYTE [DISTWIDTH] IN BLOOD BY AUTOMATED COUNT: 13.3 % (ref 12.1–15.2)
ERYTHROCYTE [SEDIMENTATION RATE] IN BLOOD BY WESTERGREN METHOD: 2 MM/HR (ref 0–15)
GFR SERPL CREATININE-BSD FRML MDRD: >60 ML/MIN/1.73M2
GLUCOSE SERPL-MCNC: 109 MG/DL (ref 70–99)
HCT VFR BLD AUTO: 45.4 % (ref 41–53)
HGB BLD-MCNC: 15.4 G/DL (ref 13.5–17.5)
LYMPHOCYTES # BLD: 29 % (ref 13–44)
LYMPHOCYTES NFR BLD: 3 K/UL (ref 1.2–5.2)
MCH RBC QN AUTO: 31 PG (ref 26–34)
MCHC RBC AUTO-ENTMCNC: 33.9 G/DL (ref 31–37)
MCV RBC AUTO: 91.4 FL (ref 80–100)
MONOCYTES NFR BLD: 0.9 K/UL (ref 0–1)
MONOCYTES NFR BLD: 9 % (ref 5–9)
NEUTROPHILS NFR BLD: 62 % (ref 39–75)
NEUTS SEG NFR BLD: 6.5 K/UL (ref 2.1–6.5)
PLATELET # BLD AUTO: 318 K/UL (ref 140–450)
POTASSIUM SERPL-SCNC: 3.6 MMOL/L (ref 3.7–5.3)
PROT SERPL-MCNC: 8.1 G/DL (ref 6.4–8.3)
RBC # BLD AUTO: 4.97 M/UL (ref 4.5–5.9)
SODIUM SERPL-SCNC: 139 MMOL/L (ref 135–144)
TSH SERPL-MCNC: 1.53 UIU/ML (ref 0.3–5)
WBC OTHER # BLD: 10.5 K/UL (ref 4.5–13.5)

## 2023-06-21 PROCEDURE — 80053 COMPREHEN METABOLIC PANEL: CPT

## 2023-06-21 PROCEDURE — 84443 ASSAY THYROID STIM HORMONE: CPT

## 2023-06-21 PROCEDURE — 36415 COLL VENOUS BLD VENIPUNCTURE: CPT

## 2023-06-21 PROCEDURE — 85652 RBC SED RATE AUTOMATED: CPT

## 2023-06-21 PROCEDURE — 85027 COMPLETE CBC AUTOMATED: CPT

## 2024-01-19 NOTE — ED TRIAGE NOTES
Med rec done with pt interview Samples Given: La Roche Posay cleanser \\nLa Roche Posay moisturizer Initiate Treatment: clindamycin 1 %-benzoyl peroxide 5 % topical gel with pump \\nSig: Apply to acne BID\\n\\nminocycline prescribed by PCP ( 2 weeks ) Detail Level: Zone

## 2024-07-17 ENCOUNTER — PATIENT MESSAGE (OUTPATIENT)
Dept: FAMILY MEDICINE CLINIC | Age: 24
End: 2024-07-17